# Patient Record
Sex: FEMALE | NOT HISPANIC OR LATINO | Employment: FULL TIME | URBAN - METROPOLITAN AREA
[De-identification: names, ages, dates, MRNs, and addresses within clinical notes are randomized per-mention and may not be internally consistent; named-entity substitution may affect disease eponyms.]

---

## 2017-04-24 ENCOUNTER — OFFICE VISIT (OUTPATIENT)
Dept: FAMILY MEDICINE CLINIC | Facility: CLINIC | Age: 27
End: 2017-04-24

## 2017-04-24 VITALS
RESPIRATION RATE: 16 BRPM | TEMPERATURE: 99 F | SYSTOLIC BLOOD PRESSURE: 126 MMHG | DIASTOLIC BLOOD PRESSURE: 70 MMHG | HEIGHT: 61 IN | OXYGEN SATURATION: 98 % | HEART RATE: 75 BPM | BODY MASS INDEX: 23.79 KG/M2 | WEIGHT: 126 LBS

## 2017-04-24 DIAGNOSIS — F32.81 PREMENSTRUAL DYSPHORIC DISORDER: Primary | ICD-10-CM

## 2017-04-24 DIAGNOSIS — F41.9 ANXIETY: ICD-10-CM

## 2017-04-24 DIAGNOSIS — M79.641 RIGHT HAND PAIN: ICD-10-CM

## 2017-04-24 LAB
ALBUMIN SERPL-MCNC: 4.6 G/DL (ref 3.5–5.2)
ALBUMIN/GLOB SERPL: 1.4 G/DL
ALP SERPL-CCNC: 64 U/L (ref 39–117)
ALT SERPL-CCNC: 14 U/L (ref 1–33)
AST SERPL-CCNC: 17 U/L (ref 1–32)
BASOPHILS # BLD AUTO: 0.03 10*3/MM3 (ref 0–0.2)
BASOPHILS NFR BLD AUTO: 0.3 % (ref 0–1.5)
BILIRUB SERPL-MCNC: <0.2 MG/DL (ref 0.1–1.2)
BUN SERPL-MCNC: 15 MG/DL (ref 6–20)
BUN/CREAT SERPL: 16.1 (ref 7–25)
CALCIUM SERPL-MCNC: 9.9 MG/DL (ref 8.6–10.5)
CHLORIDE SERPL-SCNC: 104 MMOL/L (ref 98–107)
CO2 SERPL-SCNC: 24.5 MMOL/L (ref 22–29)
CREAT SERPL-MCNC: 0.93 MG/DL (ref 0.57–1)
EOSINOPHIL # BLD AUTO: 0.1 10*3/MM3 (ref 0–0.7)
EOSINOPHIL NFR BLD AUTO: 0.9 % (ref 0.3–6.2)
ERYTHROCYTE [DISTWIDTH] IN BLOOD BY AUTOMATED COUNT: 19 % (ref 11.7–13)
GLOBULIN SER CALC-MCNC: 3.2 GM/DL
GLUCOSE SERPL-MCNC: 100 MG/DL (ref 65–99)
HCT VFR BLD AUTO: 40 % (ref 35.6–45.5)
HGB BLD-MCNC: 12.1 G/DL (ref 11.9–15.5)
IMM GRANULOCYTES # BLD: 0.02 10*3/MM3 (ref 0–0.03)
IMM GRANULOCYTES NFR BLD: 0.2 % (ref 0–0.5)
LYMPHOCYTES # BLD AUTO: 2.61 10*3/MM3 (ref 0.9–4.8)
LYMPHOCYTES NFR BLD AUTO: 24.5 % (ref 19.6–45.3)
MCH RBC QN AUTO: 24.4 PG (ref 26.9–32)
MCHC RBC AUTO-ENTMCNC: 30.3 G/DL (ref 32.4–36.3)
MCV RBC AUTO: 80.8 FL (ref 80.5–98.2)
MONOCYTES # BLD AUTO: 1.11 10*3/MM3 (ref 0.2–1.2)
MONOCYTES NFR BLD AUTO: 10.4 % (ref 5–12)
NEUTROPHILS # BLD AUTO: 6.78 10*3/MM3 (ref 1.9–8.1)
NEUTROPHILS NFR BLD AUTO: 63.7 % (ref 42.7–76)
PLATELET # BLD AUTO: 325 10*3/MM3 (ref 140–500)
POTASSIUM SERPL-SCNC: 4.8 MMOL/L (ref 3.5–5.2)
PROT SERPL-MCNC: 7.8 G/DL (ref 6–8.5)
RBC # BLD AUTO: 4.95 10*6/MM3 (ref 3.9–5.2)
SODIUM SERPL-SCNC: 139 MMOL/L (ref 136–145)
T4 SERPL-MCNC: 5.46 MCG/DL (ref 4.5–11.7)
TSH SERPL DL<=0.005 MIU/L-ACNC: 0.6 MIU/ML (ref 0.27–4.2)
WBC # BLD AUTO: 10.65 10*3/MM3 (ref 4.5–10.7)

## 2017-04-24 PROCEDURE — 99203 OFFICE O/P NEW LOW 30 MIN: CPT | Performed by: NURSE PRACTITIONER

## 2017-04-24 RX ORDER — FLUOXETINE HYDROCHLORIDE 10 MG/1
1 CAPSULE ORAL DAILY
Qty: 30 EACH | Refills: 3 | Status: SHIPPED | OUTPATIENT
Start: 2017-04-24

## 2017-04-24 RX ORDER — HYDROXYZINE HYDROCHLORIDE 25 MG/1
TABLET, FILM COATED ORAL
Qty: 90 TABLET | Refills: 1 | Status: SHIPPED | OUTPATIENT
Start: 2017-04-24

## 2017-04-24 NOTE — PROGRESS NOTES
Subjective   Greer Valerio is a 27 y.o. female. Patient is here today for   Chief Complaint   Patient presents with   • Hand Pain     PT STATES BEEN GOING ON FOR 2 WKS, HIT IT ON WALL, PT WAS ANGRY    • MENTAL HEALTH CONCERNS     PT STATES SEVERE DEPRESSION SUICIDAL THOUGHTS AND THINKS RELATED TO PERIOD, AND URINATED ON HER SELF           Vitals:    04/24/17 1034   BP: 126/70   Pulse: 75   Resp: 16   Temp: 99 °F (37.2 °C)   SpO2: 98%       Past Medical History:   Diagnosis Date   • Anxiety    • Depression    • PAN (polyarteritis nodosa)       No Known Allergies   Social History     Social History   • Marital status: Single     Spouse name: N/A   • Number of children: N/A   • Years of education: N/A     Occupational History   • Not on file.     Social History Main Topics   • Smoking status: Never Smoker   • Smokeless tobacco: Not on file   • Alcohol use Yes   • Drug use: Not on file   • Sexual activity: Not on file     Other Topics Concern   • Not on file     Social History Narrative   • No narrative on file    Patient's last menstrual period was 03/30/2017 (approximate).      Current Outpatient Prescriptions:   •  FLUoxetine HCl, PMDD, 10 MG capsule, Take 1 capsule by mouth Daily., Disp: 30 each, Rfl: 3  •  hydrOXYzine (ATARAX) 25 MG tablet, TAKE ONE TO TWO TABLETS PO EVERY 8 HOURS AS NEEDED FOR SLEEP, Disp: 90 tablet, Rfl: 1     Objective     History of Present Illness  Greer is here to establish care. It has been many years since she has seen a PCP. She does have gyn and her last pap was a year ago. 48-72 hours before her period, she has depression, problems with anger and sometimes suicidal thoughts, which she says is out of character for her. When she gets her period her symptoms seem to resolved. She does have a history of anxiety and depression that she was treated for in highschool. She took lexapro. She also hurt her hand after punching a wall a few weeks ago. She went to [facility] and had xrays done which  were normal . She still has some pain in the palmar area between the middle and index finger. She denies weakness and numbness and tingling but has had difficulty gripping and typing.   I reviewed the records from [facility], which she brought with her.   The following portions of the patient's history were reviewed and updated as appropriate: allergies, current medications, past family history, past medical history, past social history, past surgical history and problem list.    Review of Systems   Constitutional: Negative for chills, fatigue and fever.   HENT: Negative.    Respiratory: Negative.    Cardiovascular: Negative.    Musculoskeletal:        Right hand pain    Neurological: Negative for dizziness, speech difficulty, weakness, light-headedness, numbness and headaches.   Psychiatric/Behavioral: Positive for agitation, dysphoric mood, sleep disturbance and suicidal ideas (2 days before her menstrual period, no attempts, no plan ). Negative for confusion, decreased concentration, hallucinations and self-injury. The patient is nervous/anxious.         Occasional panic attacks        Physical Exam   Constitutional: Vital signs are normal. She appears well-developed and well-nourished. No distress.   Cardiovascular: Normal rate, regular rhythm, S1 normal and normal heart sounds.    Pulmonary/Chest: Effort normal and breath sounds normal.   Musculoskeletal:        Right hand: She exhibits decreased range of motion and tenderness. She exhibits no bony tenderness, normal capillary refill, no deformity, no laceration and no swelling. Normal sensation noted.        Hands:  Neurological: She is alert.   Skin: Skin is warm and dry. No rash noted.   Psychiatric: She has a normal mood and affect. Her speech is normal and behavior is normal. Judgment and thought content normal. Cognition and memory are normal.       ASSESSMENT     Problem List Items Addressed This Visit     Premenstrual dysphoric disorder - Primary     Relevant Medications    hydrOXYzine (ATARAX) 25 MG tablet    FLUoxetine HCl, PMDD, 10 MG capsule    Other Relevant Orders    CBC & Differential    Comprehensive Metabolic Panel    T4    TSH    Ambulatory Referral to Psychiatry      Other Visit Diagnoses     Right hand pain        Anxiety              PLAN    1. PMDD- start fluoxetine 10mg daily, discussed side effects, and black box warning. She was advised to call if she cannot tolerate the mediation. She can take hydroxyzine as needed for panic attacks. Due to her SI during the time of her period, I will refer her to psychiatry.  recommend that she get more sleep, discussed sleep hygiene and self care.   2 Right hand pain and injury- recommend splint, motrin or tylenol, recommend that she schedule an appt with [facility] if no improvement in the next few weeks  Will check labs today and call the patient with the results  Follow up in 4-6 weeks for a recheck or sooner if needed

## 2017-04-25 ENCOUNTER — TELEPHONE (OUTPATIENT)
Dept: FAMILY MEDICINE CLINIC | Facility: CLINIC | Age: 27
End: 2017-04-25